# Patient Record
Sex: FEMALE | Race: WHITE | Employment: UNEMPLOYED | URBAN - METROPOLITAN AREA
[De-identification: names, ages, dates, MRNs, and addresses within clinical notes are randomized per-mention and may not be internally consistent; named-entity substitution may affect disease eponyms.]

---

## 2020-12-08 ENCOUNTER — HOSPITAL ENCOUNTER (OUTPATIENT)
Dept: LAB | Age: 35
Discharge: HOME | End: 2020-12-08

## 2020-12-08 ENCOUNTER — OFFICE VISIT (OUTPATIENT)
Dept: HEMATOLOGY | Age: 35
End: 2020-12-08
Payer: MEDICAID

## 2020-12-08 VITALS
OXYGEN SATURATION: 98 % | BODY MASS INDEX: 33.38 KG/M2 | SYSTOLIC BLOOD PRESSURE: 161 MMHG | HEIGHT: 64 IN | HEART RATE: 103 BPM | WEIGHT: 195.5 LBS | TEMPERATURE: 99.2 F | DIASTOLIC BLOOD PRESSURE: 118 MMHG

## 2020-12-08 DIAGNOSIS — R74.8 ELEVATED LIVER ENZYMES: Primary | ICD-10-CM

## 2020-12-08 DIAGNOSIS — R74.8 ELEVATED LIVER ENZYMES: ICD-10-CM

## 2020-12-08 LAB — XX-LABCORP SPECIMEN COL,LCBCF: NORMAL

## 2020-12-08 PROCEDURE — 99001 SPECIMEN HANDLING PT-LAB: CPT

## 2020-12-08 PROCEDURE — 99203 OFFICE O/P NEW LOW 30 MIN: CPT | Performed by: INTERNAL MEDICINE

## 2020-12-08 RX ORDER — PROMETHAZINE HYDROCHLORIDE 12.5 MG/1
TABLET ORAL
COMMUNITY

## 2020-12-08 RX ORDER — TRAMADOL HYDROCHLORIDE 50 MG/1
50 TABLET ORAL
COMMUNITY

## 2020-12-08 RX ORDER — PANTOPRAZOLE SODIUM 40 MG/1
40 TABLET, DELAYED RELEASE ORAL 2 TIMES DAILY
COMMUNITY

## 2020-12-08 NOTE — PROGRESS NOTES
Shabana Luther MD, Lisa Terry, Julio Sanabria MD, MPH      Haley Coombs, KARMA Godfrey, [facility]     Lesley Moya, [facility]   Kelly Streeter BLAIR-C    Maris Au, [facility]       [facility] [address]    at [address], [address] [place] [address], [facility] 22.    [phone]    FAX: [address] [place] [address], [address]    [phone]    FAX: [phone]       Patient Care Team:  Laura Middleton MD as PCP - General (Family Medicine)      Problem List  Never Reviewed          Codes Class Noted    History of cholecystectomy ICD-10-CM: Z90.49  ICD-9-CM: V45.79  12/19/2020        Elevated liver enzymes ICD-10-CM: R74.8  ICD-9-CM: 790.5  12/8/2020              The clinicians listed above have asked me to see David Longo in consultation regarding elevated liver enzymes and its management. All medical records sent by the referring physicians were reviewed including imaging studies     The patient is a 30 y.o.  female who was found to have elevated liver transaminases in 10/2020. Serologic evaluation for markers of chronic liver disease was negative for HCV, HBV, CRISTIANO,     He underwent gastric sleeve in 8/2020. The baseline body weight was 255 pounds. He now weighs 195. CT scan of the liver was performed in 11/2020. The results of the imaging demonstrated a normal appearing liver. An assessment of liver fibrosis with biopsy or elastography has not been performed.     The patient has the following symptoms which are thought to be due to the liver disease:  fatigue, pain in the right side over the liver, nausea, vomiting,     The patient is not currently experiencing the following symptoms of liver disease: swelling of the abdomen, swelling of the lower extremities, hematemesis, hematochezia. The patient has Severe limitations in functional activities which can be attributed to other medical problems that are not related to the liver disease. ASSESSMENT AND PLAN:  Elevated liver enzymes  Persistent elevation in liver transaminases of unclear etiology at this time. Have performed laboratory testing to monitor liver function and degree of liver injury. This included BMP, hepatic panel, CBC with platelet count, INR. Liver transaminases are elevated. ALP is normal.  Liver function is normal.  The platelet count is normal.      Based upon laboratory studies and imaging the patient does not appear to have appears to have significant liver injury. Will perform additional serologic tests to screen for other causes of chronic liver disease. The most likely causes for the liver chemistry abnormalities were discussed with the patient and include fatty liver disease, immune liver disorders,     The need to perform an assessment of liver fibrosis was discussed with the patient. The Fibroscan can assess liver fibrosis and determine if a patient has advanced fibrosis or cirrhosis without the need for liver biopsy. This will be performed at the next office visit. If the Fibroscan suggests advanced fibrosis then a liver biopsy should be considered. The Fibroscan can be repeated annually or as often as clinically indicated to assess for fibrosis progression and/or regression. Screening for Hepatocellular Carcinoma  HCC screening is not necessary if the patient has no evidence of cirrhosis. Treatment of other medical problems in patients with chronic liver disease  There are no contraindications for the patient to take most medications that are necessary for treatment of other medical issues.     Counseling for alcohol in patients with chronic liver disease  The patient was counseled regarding alcohol consumption and the effect of alcohol on chronic liver disease. The patient does not consume any significant amount of alcohol. Patients who have undergone obesity surgery are at much greater risk to develop alcoholic liver injury. Vaccinations   Vaccination for viral hepatitis A and B is recommended since the patient has no serologic evidence of previous exposure or vaccination with immunity. Routine vaccinations against other bacterial and viral agents can be performed as indicated. Annual flu vaccination should be administered if indicated. ALLERGIES  Allergies   Allergen Reactions    Amoxicillin Rash    Doxycycline Rash    Sulfa (Sulfonamide Antibiotics) Hives       MEDICATIONS  Current Outpatient Medications   Medication Sig    traMADoL (ULTRAM) 50 mg tablet Take 50 mg by mouth every six (6) hours as needed for Pain.  promethazine (PHENERGAN) 12.5 mg tablet Take  by mouth every six (6) hours as needed for Nausea.  pantoprazole (Protonix) 40 mg tablet Take 40 mg by mouth two (2) times a day. No current facility-administered medications for this visit. SYSTEM REVIEW NOT RELATED TO LIVER DISEASE OR REVIEWED ABOVE:  Constitution systems: Negative for fever, chills, weight gain, weight loss. Eyes: Negative for visual changes. ENT: Negative for sore throat, painful swallowing. Respiratory: Negative for cough, hemoptysis, SOB. Cardiology: Negative for chest pain, palpitations. GI:  Negative for constipation or diarrhea. : Negative for urinary frequency, dysuria, hematuria, nocturia. Skin: Negative for rash. Hematology: Negative for easy bruising, blood clots. Musculo-skelatal: Negative for back pain, muscle pain, weakness. Neurologic: Negative for headaches, dizziness, vertigo, memory problems not related to HE. Psychology: Negative for anxiety, depression. FAMILY HISTORY:  The father  of MI.     The mother Has/had the following chronic disease(s): COPD, pre-DM. There is no family history of liver disease. SOCIAL HISTORY:  The patient is . The patient has 1 child. The patient 1currently smokes 1 pack of tobacco daily. The patient has never consumed significant amounts of alcohol. The patient does not work outside the home. PHYSICAL EXAMINATION:  Visit Vitals  BP (!) 161/118   Pulse (!) 103   Temp 99.2 °F (37.3 °C) (Tympanic)   Ht 5' 4\" (1.626 m)   Wt 195 lb 8 oz (88.7 kg)   SpO2 98%   BMI 33.56 kg/m²     General: No acute distress. Eyes: Sclera anicteric. ENT: No oral lesions. Thyroid normal.  Nodes: No adenopathy. Skin: No spider angiomata. No jaundice. No palmar erythema. Respiratory: Lungs clear to auscultation. Cardiovascular: Regular heart rate. No murmurs. No JVD. Abdomen: Soft non-tender. Liver size normal to percussion/palpation. Spleen not palpable. No obvious ascites. Extremities: No edema. No muscle wasting. No gross arthritic changes. Neurologic: Alert and oriented. Cranial nerves grossly intact. No asterixis. LABORATORY STUDIES:  Liver [place] of [address] Units 12/8/2020   WBC 3.4 - 10.8 x10E3/uL 9.5   ANC 1.4 - 7.0 x10E3/uL 6.0   HGB 11.1 - 15.9 g/dL 14.2    - 450 x10E3/uL 329   AST 0 - 40 IU/L 145 (H)   ALT 0 - 32 IU/L 249 (H)   Alk Phos 39 - 117 IU/L 68   Bili, Total 0.0 - 1.2 mg/dL 0.7   Bili, Direct 0.00 - 0.40 mg/dL 0.36   Albumin 3.8 - 4.8 g/dL 3.8   BUN 6 - 20 mg/dL 11   Creat 0.57 - 1.00 mg/dL 0.57   Na 134 - 144 mmol/L 136   K 3.5 - 5.2 mmol/L 3.7   Cl 96 - 106 mmol/L 99   CO2 20 - 29 mmol/L 20   Glucose 65 - 99 mg/dL 107 (H)     SEROLOGIES:  10/2020. FE saturation 25%  11/2020.   HBsurface antigen negative, anti-HCV negative, CRISTIANO negative    Serologies Latest Ref Rng & Units 12/8/2020   Hep A Ab, Total Negative Negative   Hep B Core Ab, Total Negative Negative   Hep B Surface AB QL  Non Reactive   Ferritin 15 - 150 ng/mL 347 (H)   Iron % Saturation 15 - 55 % 14 (L)   CRISTIANO, IFA  Negative   ASMCA 0 - 19 Units 5   Ceruloplasmin 19.0 - 39.0 mg/dL 47.0 (H)   Alpha-1 antitrypsin level 100 - 188 mg/dL 226 (H)     LIVER HISTOLOGY:  Not available or performed    ENDOSCOPIC PROCEDURES:  Not available or performed    RADIOLOGY:  11/2020. CT scan abdomen with IV contrast.  Normal appearing liver. No liver mass lesions. Normal spleen. No ascites. OTHER TESTING:  Not available or performed    FOLLOW-UP:  All of the issues listed above in the Assessment and Plan were discussed with the patient. All questions were answered. The patient expressed a clear understanding of the above. [address] [address] in 4 weeks for Fibroscan to review all data and determine the treatment plan.       Tyrell Shen MD  [address]  [address] [place] [address], [address]  [address] [place] [address]

## 2020-12-08 NOTE — Clinical Note
12/19/2020 Patient: Briseida Cruz YOB: 1985 Date of Visit: 12/8/2020 Mari Waller MD 
[facility] [address] [phone] [address] Fax: [phone] Dear Mari Waller MD, Thank you for referring Ms. Timmy Jackson to [facility] for evaluation. My notes for this consultation are attached. If you have questions, please do not hesitate to call me. I look forward to following your patient along with you. Sincerely, Constance Blackmon MD

## 2020-12-10 LAB
A1AT SERPL-MCNC: 226 MG/DL (ref 100–188)
ACTIN IGG SERPL-ACNC: 5 UNITS (ref 0–19)
ALBUMIN SERPL-MCNC: 3.8 G/DL (ref 3.8–4.8)
ALP SERPL-CCNC: 68 IU/L (ref 39–117)
ALT SERPL-CCNC: 249 IU/L (ref 0–32)
ANA TITR SER IF: NEGATIVE {TITER}
AST SERPL-CCNC: 145 IU/L (ref 0–40)
BASOPHILS # BLD AUTO: 0.1 X10E3/UL (ref 0–0.2)
BASOPHILS NFR BLD AUTO: 1 %
BILIRUB DIRECT SERPL-MCNC: 0.36 MG/DL (ref 0–0.4)
BILIRUB SERPL-MCNC: 0.7 MG/DL (ref 0–1.2)
BUN SERPL-MCNC: 11 MG/DL (ref 6–20)
BUN/CREAT SERPL: 19 (ref 9–23)
CALCIUM SERPL-MCNC: 9.2 MG/DL (ref 8.7–10.2)
CERULOPLASMIN SERPL-MCNC: 47 MG/DL (ref 19–39)
CHLORIDE SERPL-SCNC: 99 MMOL/L (ref 96–106)
CO2 SERPL-SCNC: 20 MMOL/L (ref 20–29)
CREAT SERPL-MCNC: 0.57 MG/DL (ref 0.57–1)
EOSINOPHIL # BLD AUTO: 0.3 X10E3/UL (ref 0–0.4)
EOSINOPHIL NFR BLD AUTO: 3 %
ERYTHROCYTE [DISTWIDTH] IN BLOOD BY AUTOMATED COUNT: 15.4 % (ref 11.7–15.4)
FERRITIN SERPL-MCNC: 347 NG/ML (ref 15–150)
GLUCOSE SERPL-MCNC: 107 MG/DL (ref 65–99)
HAV AB SER QL IA: NEGATIVE
HBV CORE AB SERPL QL IA: NEGATIVE
HBV SURFACE AB SER QL: NON REACTIVE
HCT VFR BLD AUTO: 40.7 % (ref 34–46.6)
HGB BLD-MCNC: 14.2 G/DL (ref 11.1–15.9)
IMM GRANULOCYTES # BLD AUTO: 0 X10E3/UL (ref 0–0.1)
IMM GRANULOCYTES NFR BLD AUTO: 0 %
IRON SATN MFR SERPL: 14 % (ref 15–55)
IRON SERPL-MCNC: 47 UG/DL (ref 27–159)
LYMPHOCYTES # BLD AUTO: 2.6 X10E3/UL (ref 0.7–3.1)
LYMPHOCYTES NFR BLD AUTO: 27 %
MCH RBC QN AUTO: 32.2 PG (ref 26.6–33)
MCHC RBC AUTO-ENTMCNC: 34.9 G/DL (ref 31.5–35.7)
MCV RBC AUTO: 92 FL (ref 79–97)
MONOCYTES # BLD AUTO: 0.6 X10E3/UL (ref 0.1–0.9)
MONOCYTES NFR BLD AUTO: 6 %
NEUTROPHILS # BLD AUTO: 6 X10E3/UL (ref 1.4–7)
NEUTROPHILS NFR BLD AUTO: 63 %
PLATELET # BLD AUTO: 329 X10E3/UL (ref 150–450)
POTASSIUM SERPL-SCNC: 3.7 MMOL/L (ref 3.5–5.2)
PROT SERPL-MCNC: 6.6 G/DL (ref 6–8.5)
RBC # BLD AUTO: 4.41 X10E6/UL (ref 3.77–5.28)
SODIUM SERPL-SCNC: 136 MMOL/L (ref 134–144)
TIBC SERPL-MCNC: 346 UG/DL (ref 250–450)
UIBC SERPL-MCNC: 299 UG/DL (ref 131–425)
WBC # BLD AUTO: 9.5 X10E3/UL (ref 3.4–10.8)

## 2020-12-19 PROBLEM — Z90.49 HISTORY OF CHOLECYSTECTOMY: Status: ACTIVE | Noted: 2020-12-19
